# Patient Record
Sex: MALE | Race: WHITE | NOT HISPANIC OR LATINO | Employment: UNEMPLOYED | URBAN - METROPOLITAN AREA
[De-identification: names, ages, dates, MRNs, and addresses within clinical notes are randomized per-mention and may not be internally consistent; named-entity substitution may affect disease eponyms.]

---

## 2020-05-20 ENCOUNTER — HOSPITAL ENCOUNTER (EMERGENCY)
Facility: HOSPITAL | Age: 40
Discharge: HOME | End: 2020-05-20
Attending: EMERGENCY MEDICINE
Payer: COMMERCIAL

## 2020-05-20 VITALS
HEIGHT: 66 IN | WEIGHT: 175 LBS | SYSTOLIC BLOOD PRESSURE: 126 MMHG | RESPIRATION RATE: 18 BRPM | OXYGEN SATURATION: 99 % | HEART RATE: 80 BPM | DIASTOLIC BLOOD PRESSURE: 80 MMHG | TEMPERATURE: 98 F | BODY MASS INDEX: 28.12 KG/M2

## 2020-05-20 DIAGNOSIS — M54.32 SCIATICA OF LEFT SIDE: Primary | ICD-10-CM

## 2020-05-20 PROCEDURE — 63600175 PHARM REV CODE 636 W HCPCS: Mod: ER | Performed by: EMERGENCY MEDICINE

## 2020-05-20 PROCEDURE — 99284 EMERGENCY DEPT VISIT MOD MDM: CPT | Mod: 25,ER

## 2020-05-20 PROCEDURE — 25000003 PHARM REV CODE 250: Mod: ER | Performed by: EMERGENCY MEDICINE

## 2020-05-20 PROCEDURE — 96372 THER/PROPH/DIAG INJ SC/IM: CPT | Mod: ER

## 2020-05-20 RX ORDER — METHOCARBAMOL 500 MG/1
1000 TABLET, FILM COATED ORAL
Status: COMPLETED | OUTPATIENT
Start: 2020-05-20 | End: 2020-05-20

## 2020-05-20 RX ORDER — KETOROLAC TROMETHAMINE 30 MG/ML
30 INJECTION, SOLUTION INTRAMUSCULAR; INTRAVENOUS
Status: COMPLETED | OUTPATIENT
Start: 2020-05-20 | End: 2020-05-20

## 2020-05-20 RX ORDER — METHOCARBAMOL 500 MG/1
500 TABLET, FILM COATED ORAL 3 TIMES DAILY
Qty: 15 TABLET | Refills: 0 | Status: SHIPPED | OUTPATIENT
Start: 2020-05-20 | End: 2020-05-25

## 2020-05-20 RX ORDER — HYDROCODONE BITARTRATE AND ACETAMINOPHEN 5; 325 MG/1; MG/1
1 TABLET ORAL
Status: COMPLETED | OUTPATIENT
Start: 2020-05-20 | End: 2020-05-20

## 2020-05-20 RX ORDER — MELOXICAM 7.5 MG/1
7.5 TABLET ORAL DAILY
Qty: 20 TABLET | Refills: 0 | Status: SHIPPED | OUTPATIENT
Start: 2020-05-20

## 2020-05-20 RX ORDER — TRAMADOL HYDROCHLORIDE 50 MG/1
50 TABLET ORAL EVERY 6 HOURS PRN
Qty: 12 TABLET | Refills: 0 | Status: SHIPPED | OUTPATIENT
Start: 2020-05-20 | End: 2020-05-30

## 2020-05-20 RX ADMIN — KETOROLAC TROMETHAMINE 30 MG: 30 INJECTION, SOLUTION INTRAMUSCULAR at 09:05

## 2020-05-20 RX ADMIN — METHOCARBAMOL TABLETS 1000 MG: 500 TABLET, COATED ORAL at 09:05

## 2020-05-20 RX ADMIN — HYDROCODONE BITARTRATE AND ACETAMINOPHEN 1 TABLET: 5; 325 TABLET ORAL at 09:05

## 2020-05-21 NOTE — ED PROVIDER NOTES
Encounter Date: 5/20/2020    SCRIBE #1 NOTE: I, Shae Oneil, am scribing for, and in the presence of,  Dr. Bright Villagomez. I have scribed the following portions of the note - Other sections scribed: HPI, ROS, PE.       History     Chief Complaint   Patient presents with    Back Pain     L lower back pain radiating down L leg after gardening starting today around 4pm unrelieved by ibuprofen     CC: Lower Back Pain     Harry Gutierrez is a 39 y.o. Male with no known significant PMHx or PSHx who presents to the ED complaining of acute, constant, 9/10, sharp lower left back pain radiating down to left foot starting today (4pm) while gardening. Patient reports repetitive bending over and crouching down while gardening. Notes intermittent numbness and tingling to left greater toe. Endorses antalgic gait. Pain reproducible with back ROM and walking.  No midline pain.  Denies relief s/p Ibuprofen. Denies any unexpected weight loss, fever and chills. Denies abdominal pain, nausea and vomiting. Denies pelvic pain, flank pain and scrotal pain/swelling. Denies joint swelling, neck pain, and weakness. Denies bladder/bowel incontinence.  No saddle anesthesia.      The history is provided by the patient. No  was used.     Review of patient's allergies indicates:  No Known Allergies  History reviewed. No pertinent past medical history.  History reviewed. No pertinent surgical history.  History reviewed. No pertinent family history.  Social History     Tobacco Use    Smoking status: Never Smoker    Smokeless tobacco: Current User   Substance Use Topics    Alcohol use: Yes    Drug use: Never     Review of Systems   Constitutional: Negative for chills, diaphoresis, fever and unexpected weight change.   Eyes: Negative.    Respiratory: Negative for cough and shortness of breath.    Cardiovascular: Negative for chest pain and palpitations.   Gastrointestinal: Negative for abdominal pain, diarrhea, nausea  and vomiting.        NO melena or rectal bleeding   Genitourinary: Negative for dysuria, flank pain and frequency.   Musculoskeletal: Positive for back pain and gait problem (antalgic gait). Negative for joint swelling and neck pain.   Skin: Negative for rash.   Neurological: Positive for numbness. Negative for dizziness, syncope, speech difficulty, weakness and headaches.        + tingling   All other systems reviewed and are negative.      Physical Exam     Initial Vitals [05/20/20 2047]   BP Pulse Resp Temp SpO2   128/76 78 18 98.2 °F (36.8 °C) 100 %      MAP       --         Physical Exam    Nursing note and vitals reviewed.  Constitutional: He appears well-developed and well-nourished. He is not diaphoretic. No distress.   HENT:   Head: Normocephalic and atraumatic.   Nose: Nose normal.   Eyes: Conjunctivae and EOM are normal. Pupils are equal, round, and reactive to light. Right eye exhibits no discharge. Left eye exhibits no discharge. No scleral icterus.   Neck: Neck supple. No tracheal deviation present.   Pulmonary/Chest: No stridor. No respiratory distress.   Abdominal: Soft. He exhibits no distension. There is no tenderness. There is no rebound and no guarding.   Musculoskeletal: Normal range of motion. He exhibits no edema or tenderness.   Tenderness to left posterior hip and buttocks.   (+) straight leg raise on the left.   No midline tenderness of C/T/L-spine.  Pain reproducible with ROM of back.   Decreased sensation of left greater toe. Motor function of left greater toe is intact.  No defect in dorsiflexion or plantar flexion of the foot.  Normal proximal leg strength on left.  Sensation and motor function of 2-5 digit of left foot are intact.  Normal right lower extremity exam.  Normal left knee and hip.   Neurological: He is alert and oriented to person, place, and time. He has normal strength. No cranial nerve deficit or sensory deficit. GCS score is 15. GCS eye subscore is 4. GCS verbal  subscore is 5. GCS motor subscore is 6.   Skin: Skin is warm and dry.   Psychiatric: He has a normal mood and affect. His behavior is normal. Judgment and thought content normal.         ED Course   Procedures  Labs Reviewed - No data to display       Imaging Results          X-Ray Lumbar Spine Ap And Lateral (Final result)  Result time 05/20/20 21:20:12    Final result by Bal Smith MD (05/20/20 21:20:12)                 Impression:      No acute lumbar spine abnormalities identified.      Electronically signed by: Bal Smith MD  Date:    05/20/2020  Time:    21:20             Narrative:    EXAMINATION:  XR LUMBAR SPINE AP AND LATERAL    CLINICAL HISTORY:  Low back pain, <6wks, no red flags, no prior management;    TECHNIQUE:  AP, lateral and spot images were performed of the lumbar spine.    COMPARISON:  None    FINDINGS:  Lumbar spine alignment is within normal limits.  No evidence of acute lumbar spine fracture or subluxation.  Intervertebral disc spaces appear fairly well maintained.  Visualized sacrum is unremarkable.                                 Medical Decision Making:   History:   Old Medical Records: I decided to obtain old medical records.  Initial Assessment:   Patient presents complaints of left-sided back pain radiating to the foot.  Symptoms consistent with sciatica.  Patient has a positive straight leg raise on the left.  No significant neurologic deficits except for decreased light touch sensation over the L5 nerve root distribution.  No motor deficits.  No saddle anesthesia.  No incontinence.  Will obtain plain film of the lower back.  Will provide analgesics and muscle relaxers.  Plan to refer patient to primary care and start physical therapy  Differential Diagnosis:   Sciatica, disc herniation, osteoarthritis, muscle strain  Independently Interpreted Test(s):   I have ordered and independently interpreted X-rays - see prior notes.  Clinical Tests:   Radiological Study: Ordered and  Reviewed            Scribe Attestation:   Scribe #1: I performed the above scribed service and the documentation accurately describes the services I performed. I attest to the accuracy of the note.                      Bright Villagomez MD    Clinical Impression:     1. Sciatica of left side            Disposition:   Disposition: Discharged  Condition: Stable                        Bright Villagomez MD  05/20/20 2123